# Patient Record
Sex: FEMALE | URBAN - NONMETROPOLITAN AREA
[De-identification: names, ages, dates, MRNs, and addresses within clinical notes are randomized per-mention and may not be internally consistent; named-entity substitution may affect disease eponyms.]

---

## 2023-04-18 ENCOUNTER — APPOINTMENT (OUTPATIENT)
Dept: URBAN - NONMETROPOLITAN AREA CLINIC 55 | Age: 63
Setting detail: DERMATOLOGY
End: 2023-04-18

## 2023-04-18 DIAGNOSIS — L27.1 LOCALIZED SKIN ERUPTION DUE TO DRUGS AND MEDICAMENTS TAKEN INTERNALLY: ICD-10-CM

## 2023-04-18 DIAGNOSIS — Z71.89 OTHER SPECIFIED COUNSELING: ICD-10-CM

## 2023-04-18 PROBLEM — L30.9 DERMATITIS, UNSPECIFIED: Status: ACTIVE | Noted: 2023-04-18

## 2023-04-18 PROCEDURE — OTHER COUNSELING: OTHER

## 2023-04-18 PROCEDURE — OTHER PATIENT SPECIFIC COUNSELING: OTHER

## 2023-04-18 PROCEDURE — OTHER ORDER TESTS: OTHER

## 2023-04-18 PROCEDURE — OTHER PRESCRIPTION: OTHER

## 2023-04-18 PROCEDURE — OTHER MIPS QUALITY: OTHER

## 2023-04-18 PROCEDURE — 11102 TANGNTL BX SKIN SINGLE LES: CPT

## 2023-04-18 PROCEDURE — OTHER BIOPSY BY SHAVE METHOD: OTHER

## 2023-04-18 PROCEDURE — 99202 OFFICE O/P NEW SF 15 MIN: CPT | Mod: 25

## 2023-04-18 RX ORDER — MUPIROCIN 20 MG/G
OINTMENT TOPICAL
Qty: 22 | Refills: 0 | Status: ERX | COMMUNITY
Start: 2023-04-18

## 2023-04-18 ASSESSMENT — LOCATION DETAILED DESCRIPTION DERM: LOCATION DETAILED: RIGHT HYPOTHENAR EMINENCE

## 2023-04-18 ASSESSMENT — LOCATION SIMPLE DESCRIPTION DERM: LOCATION SIMPLE: RIGHT HAND

## 2023-04-18 ASSESSMENT — LOCATION ZONE DERM: LOCATION ZONE: HAND

## 2023-04-18 NOTE — PROCEDURE: MIPS QUALITY
Quality 130: Documentation Of Current Medications In The Medical Record: Current Medications Documented
Quality 110: Preventive Care And Screening: Influenza Immunization: Influenza immunization was not ordered or administered, reason not given
Detail Level: Detailed
Quality 111:Pneumonia Vaccination Status For Older Adults: Pneumococcal vaccine (PPSV23) was not administered on or after patient’s 60th birthday and before the end of the measurement period, reason not otherwise specified
Quality 431: Preventive Care And Screening: Unhealthy Alcohol Use - Screening: Patient not identified as an unhealthy alcohol user when screened for unhealthy alcohol use using a systematic screening method
Quality 226: Preventive Care And Screening: Tobacco Use: Screening And Cessation Intervention: Patient screened for tobacco use and is an ex/non-smoker

## 2023-04-18 NOTE — PROCEDURE: PATIENT SPECIFIC COUNSELING
Margins drawn w sharpie around affected area. Patient to call PA if redness goes outside of area drawn. PA gave patient personal contact information to call if she worsens. She verbalized understanding. Patient had a hip infection and was on vancomycin. When she finished vanc she developed this on hand. She states improved, no pain. An infectious disease doctor told her she did not have to take doxycycline but did have to finish her Medrol dose pack. We will recommend further treatment based on result of path and C&S.
Detail Level: Detailed

## 2023-04-18 NOTE — HPI: SKIN LESION
What Type Of Note Output Would You Prefer (Optional)?: Standard Output
Is This A New Presentation, Or A Follow-Up?: Skin Lesion
Additional History: Given steroid dose pack and keflex without some mild improvement

## 2023-04-18 NOTE — PROCEDURE: BIOPSY BY SHAVE METHOD
Cryotherapy Text: The wound bed was treated with cryotherapy after the biopsy was performed.
Detail Level: Detailed
Notification Instructions: Patient will be notified of biopsy results. However, patient instructed to call the office if not contacted within 2 weeks.
Validate Note Data (See Information Below): No
Hemostasis: Drysol
Silver Nitrate Text: The wound bed was treated with silver nitrate after the biopsy was performed.
Curettage Text: The wound bed was treated with curettage after the biopsy was performed.
Dressing: bandage
Consent: Written consent was obtained and risks were reviewed including but not limited to scarring, infection, bleeding, scabbing, incomplete removal, nerve damage and allergy to anesthesia.
Electrodesiccation Text: The wound bed was treated with electrodesiccation after the biopsy was performed.
Size Of Lesion In Cm: 3.5
Wound Care: Petrolatum
Anesthesia Type: 1% lidocaine with epinephrine
Post-Care Instructions: I reviewed with the patient in detail post-care instructions. Patient is to keep the biopsy site dry overnight, and then apply bacitracin twice daily until healed. Patient may apply hydrogen peroxide soaks to remove any crusting.
Was A Bandage Applied: Yes
Biopsy Method: Dermablade
Information: Selecting Yes will display possible errors in your note based on the variables you have selected. This validation is only offered as a suggestion for you. PLEASE NOTE THAT THE VALIDATION TEXT WILL BE REMOVED WHEN YOU FINALIZE YOUR NOTE. IF YOU WANT TO FAX A PRELIMINARY NOTE YOU WILL NEED TO TOGGLE THIS TO 'NO' IF YOU DO NOT WANT IT IN YOUR FAXED NOTE.
Electrodesiccation And Curettage Text: The wound bed was treated with electrodesiccation and curettage after the biopsy was performed.
Path Notes (To The Dermatopathologist): This patient had a severe hip infection, which required vancomycin. After she finished vancomycin, rash on hand appeared. Please evaluate
Billing Type: Third-Party Bill
Biopsy Type: H and E
Anesthesia Volume In Cc (Will Not Render If 0): 0.5
Depth Of Biopsy: dermis
X Size Of Lesion In Cm: 3
Type Of Destruction Used: Curettage
Additional Anesthesia Volume In Cc (Will Not Render If 0): 0

## 2023-05-03 ENCOUNTER — RX ONLY (RX ONLY)
Age: 63
End: 2023-05-03

## 2023-05-03 RX ORDER — MOMETASONE FUROATE 1 MG/G
CREAM TOPICAL
Qty: 15 | Refills: 0 | Status: ERX | COMMUNITY
Start: 2023-05-03